# Patient Record
Sex: FEMALE | Race: WHITE | NOT HISPANIC OR LATINO | URBAN - METROPOLITAN AREA
[De-identification: names, ages, dates, MRNs, and addresses within clinical notes are randomized per-mention and may not be internally consistent; named-entity substitution may affect disease eponyms.]

---

## 2018-07-17 ENCOUNTER — APPOINTMENT (RX ONLY)
Dept: URBAN - METROPOLITAN AREA CLINIC 24 | Facility: CLINIC | Age: 58
Setting detail: DERMATOLOGY
End: 2018-07-17

## 2018-07-17 DIAGNOSIS — Z41.9 ENCOUNTER FOR PROCEDURE FOR PURPOSES OTHER THAN REMEDYING HEALTH STATE, UNSPECIFIED: ICD-10-CM

## 2018-07-17 DIAGNOSIS — L73.8 OTHER SPECIFIED FOLLICULAR DISORDERS: ICD-10-CM

## 2018-07-17 DIAGNOSIS — L72.0 EPIDERMAL CYST: ICD-10-CM

## 2018-07-17 DIAGNOSIS — L71.8 OTHER ROSACEA: ICD-10-CM

## 2018-07-17 PROCEDURE — ? COUNSELING

## 2018-07-17 PROCEDURE — 99213 OFFICE O/P EST LOW 20 MIN: CPT

## 2018-07-17 PROCEDURE — ? TREATMENT REGIMEN

## 2018-07-17 PROCEDURE — ? PRESCRIPTION

## 2018-07-17 PROCEDURE — ? COSMETIC CONSULTATION - KYBELLA

## 2018-07-17 RX ORDER — DOXYCYCLINE 40 MG/1
1 CAPSULE ORAL QD
Qty: 90 | Refills: 3

## 2018-07-17 RX ORDER — METRONIDAZOLE 10 MG/G
1 GEL TOPICAL QHS
Qty: 1 | Refills: 11

## 2018-07-17 ASSESSMENT — LOCATION DETAILED DESCRIPTION DERM
LOCATION DETAILED: LEFT MEDIAL MALAR CHEEK
LOCATION DETAILED: LEFT INFERIOR MEDIAL FOREHEAD
LOCATION DETAILED: SUBMENTAL CHIN
LOCATION DETAILED: LEFT CENTRAL MALAR CHEEK
LOCATION DETAILED: RIGHT MEDIAL MALAR CHEEK

## 2018-07-17 ASSESSMENT — LOCATION SIMPLE DESCRIPTION DERM
LOCATION SIMPLE: LEFT FOREHEAD
LOCATION SIMPLE: LEFT CHEEK
LOCATION SIMPLE: RIGHT CHEEK
LOCATION SIMPLE: SUBMENTAL CHIN

## 2018-07-17 ASSESSMENT — LOCATION ZONE DERM: LOCATION ZONE: FACE

## 2018-07-17 NOTE — PROCEDURE: TREATMENT REGIMEN
Continue Regimen: oracea 40 mg QD \\nMetrogel 1% daily
Plan: - picture taken at today’s visit \\n- disc triggers of Rosacea \\n- advised patient stress can make her Rosacea flare \\n- patient would like to start back the [place] 40mg qd and the Metrogel daily \\n- will re fill both medications at today’s visit \\n- patient will follow up in 6 months
Detail Level: Zone
Detail Level: Simple
Plan: - advised patient extractions can help\\n- [facility]

## 2018-07-17 NOTE — PROCEDURE: COSMETIC CONSULTATION - KYBELLA
Vials Recommended (Won't Render If 0): 3
Consultation Charge $ (Use Numbers Only, No Special Characters Or $): 0
Detail Level: Detailed

## 2018-09-05 ENCOUNTER — APPOINTMENT (RX ONLY)
Dept: URBAN - METROPOLITAN AREA CLINIC 24 | Facility: CLINIC | Age: 58
Setting detail: DERMATOLOGY
End: 2018-09-05

## 2018-09-05 DIAGNOSIS — Z41.9 ENCOUNTER FOR PROCEDURE FOR PURPOSES OTHER THAN REMEDYING HEALTH STATE, UNSPECIFIED: ICD-10-CM

## 2018-09-05 PROCEDURE — ? MEDICAL CONSULTATION: CHEMICAL PEELS

## 2018-09-05 ASSESSMENT — LOCATION SIMPLE DESCRIPTION DERM: LOCATION SIMPLE: LEFT CHEEK

## 2018-09-05 ASSESSMENT — LOCATION ZONE DERM: LOCATION ZONE: FACE

## 2018-09-05 ASSESSMENT — LOCATION DETAILED DESCRIPTION DERM: LOCATION DETAILED: LEFT INFERIOR CENTRAL MALAR CHEEK

## 2018-09-21 ENCOUNTER — APPOINTMENT (RX ONLY)
Dept: URBAN - METROPOLITAN AREA CLINIC 24 | Facility: CLINIC | Age: 58
Setting detail: DERMATOLOGY
End: 2018-09-21

## 2018-09-21 DIAGNOSIS — Z41.9 ENCOUNTER FOR PROCEDURE FOR PURPOSES OTHER THAN REMEDYING HEALTH STATE, UNSPECIFIED: ICD-10-CM

## 2018-09-21 PROCEDURE — ? FACIAL

## 2018-09-21 ASSESSMENT — LOCATION DETAILED DESCRIPTION DERM: LOCATION DETAILED: LEFT INFERIOR MEDIAL MALAR CHEEK

## 2018-09-21 ASSESSMENT — LOCATION ZONE DERM: LOCATION ZONE: FACE

## 2018-09-21 ASSESSMENT — LOCATION SIMPLE DESCRIPTION DERM: LOCATION SIMPLE: LEFT CHEEK

## 2018-09-21 NOTE — PROCEDURE: FACIAL
Mask Type (Optional): calming
Comments (Sticky): Avene cleanser- no steam, mandelic, vital c  enzyme- steam/ hot towel, light extractions, phyto mask + milk mask, cold towel, phyto gel, epidermal repair, uv clear, ormedic lip.
Extraction Method: cotton-tipped applicator
Detail Level: Zone
Price (Use Numbers Only, No Special Characters Or $): 125
Treatment Type (Optional): Spa Facial
Facial Steaming: steamed
Exfoliation Type: enzyme
Assessment: Rosacea